# Patient Record
Sex: FEMALE | Race: BLACK OR AFRICAN AMERICAN | NOT HISPANIC OR LATINO | URBAN - METROPOLITAN AREA
[De-identification: names, ages, dates, MRNs, and addresses within clinical notes are randomized per-mention and may not be internally consistent; named-entity substitution may affect disease eponyms.]

---

## 2018-09-15 ENCOUNTER — EMERGENCY (EMERGENCY)
Facility: HOSPITAL | Age: 28
LOS: 1 days | Discharge: HOME | End: 2018-09-15
Attending: EMERGENCY MEDICINE
Payer: MEDICAID

## 2018-09-15 VITALS
RESPIRATION RATE: 18 BRPM | HEIGHT: 69 IN | SYSTOLIC BLOOD PRESSURE: 144 MMHG | TEMPERATURE: 99 F | DIASTOLIC BLOOD PRESSURE: 92 MMHG | HEART RATE: 71 BPM | WEIGHT: 190.04 LBS | OXYGEN SATURATION: 99 %

## 2018-09-15 LAB — S PYO AG SPEC QL IA: NEGATIVE — SIGNIFICANT CHANGE UP

## 2018-09-15 PROCEDURE — 99284 EMERGENCY DEPT VISIT MOD MDM: CPT

## 2018-09-15 RX ORDER — SODIUM CHLORIDE 9 MG/ML
1000 INJECTION INTRAMUSCULAR; INTRAVENOUS; SUBCUTANEOUS ONCE
Qty: 0 | Refills: 0 | Status: COMPLETED | OUTPATIENT
Start: 2018-09-15 | End: 2018-09-15

## 2018-09-15 RX ORDER — KETOROLAC TROMETHAMINE 30 MG/ML
30 SYRINGE (ML) INJECTION ONCE
Qty: 0 | Refills: 0 | Status: DISCONTINUED | OUTPATIENT
Start: 2018-09-15 | End: 2018-09-15

## 2018-09-15 RX ORDER — DEXAMETHASONE 0.5 MG/5ML
6 ELIXIR ORAL ONCE
Qty: 0 | Refills: 0 | Status: COMPLETED | OUTPATIENT
Start: 2018-09-15 | End: 2018-09-15

## 2018-09-15 NOTE — ED PROVIDER NOTE - PROGRESS NOTE DETAILS
pt reports improvement in sxs, is swallowing liquids and solids in the ed, pt to follow up with ent, d/c

## 2018-09-15 NOTE — ED ADULT TRIAGE NOTE - CHIEF COMPLAINT QUOTE
mother states that daughter has sore throat has been to ENT given medication without relief can not swallow or talk due to pain, has lost weight and feels nauseated, symptoms getting worse, tonsil swollen

## 2018-09-15 NOTE — ED PROVIDER NOTE - PHYSICAL EXAMINATION
Const: Awake, alert and oriented. In no acute distress. Well appearing.  HEENT: NC/AT. Moist mucous membranes. Ears: TM's clear bilaterally Throat: Pharynx erythematous, pus noted to back, uvula is midline, tongue symmetrical   Eyes: PERRL, conjunctiva pink, sclera white bilaterally. EOMI.  Neck:. Soft and supple. Full ROM without pain.  Cardiac:+S1/S2. No murmurs.   Resp: Speaking in full sentences. No evidence of respiratory distress. No wheezes, rales or rhonchi.  Abd: Soft, non-tender, non-distended. Normal bowel sounds in all 4 quadrants. No guarding or rebound.  Back: Spine midline and non-tender. No CVAT.  Skin: No rashes, abrasions or lacerations.  Lymph: No cervical lymphadenopathy.  Neuro: Awake, alert & oriented x 3. Moves all extremities symmetrically.

## 2018-09-15 NOTE — ED PROVIDER NOTE - OBJECTIVE STATEMENT
Patient is a 29 y/o female presenting with difficulty swallowing for the past week. Mother states last wednesday patient was experiencing a sore throat, went to urgent care and tested positive for mono. Patient states she was told to follow up with ENT, went to ENT this wednesday, who informed her to continue with antibiotics (clindamycin) and steroids. Patient states she has been having trouble swallowing for the past week, has lost ten pounds in one week. Patient admits to chills. Patient denies cp, SOB, nausea, vomiting.

## 2018-09-15 NOTE — ED STATDOCS - ATTENDING CONTRIBUTION TO CARE
I personally saw the patient with the PA, and completed the key components of the history and physical exam. I then discussed the management plan with the PA. In brief Hx (sore throat difficulty swallowing)  Exam (bilateral exuadates airway clear) Plan (steroids toradol fluids)

## 2018-09-16 LAB
ALBUMIN SERPL ELPH-MCNC: 4.4 G/DL — SIGNIFICANT CHANGE UP (ref 3.3–5.2)
ALP SERPL-CCNC: 81 U/L — SIGNIFICANT CHANGE UP (ref 40–120)
ALT FLD-CCNC: 48 U/L — HIGH
ANION GAP SERPL CALC-SCNC: 17 MMOL/L — SIGNIFICANT CHANGE UP (ref 5–17)
AST SERPL-CCNC: 24 U/L — SIGNIFICANT CHANGE UP
BASOPHILS # BLD AUTO: 0 K/UL — SIGNIFICANT CHANGE UP (ref 0–0.2)
BASOPHILS NFR BLD AUTO: 0.3 % — SIGNIFICANT CHANGE UP (ref 0–2)
BILIRUB SERPL-MCNC: 1.1 MG/DL — SIGNIFICANT CHANGE UP (ref 0.4–2)
BUN SERPL-MCNC: 9 MG/DL — SIGNIFICANT CHANGE UP (ref 8–20)
CALCIUM SERPL-MCNC: 9.6 MG/DL — SIGNIFICANT CHANGE UP (ref 8.6–10.2)
CHLORIDE SERPL-SCNC: 100 MMOL/L — SIGNIFICANT CHANGE UP (ref 98–107)
CO2 SERPL-SCNC: 25 MMOL/L — SIGNIFICANT CHANGE UP (ref 22–29)
CREAT SERPL-MCNC: 0.8 MG/DL — SIGNIFICANT CHANGE UP (ref 0.5–1.3)
EOSINOPHIL # BLD AUTO: 0 K/UL — SIGNIFICANT CHANGE UP (ref 0–0.5)
EOSINOPHIL NFR BLD AUTO: 0.1 % — SIGNIFICANT CHANGE UP (ref 0–6)
GLUCOSE SERPL-MCNC: 109 MG/DL — SIGNIFICANT CHANGE UP (ref 70–115)
HCT VFR BLD CALC: 41.6 % — SIGNIFICANT CHANGE UP (ref 37–47)
HGB BLD-MCNC: 13.6 G/DL — SIGNIFICANT CHANGE UP (ref 12–16)
LYMPHOCYTES # BLD AUTO: 29.1 % — SIGNIFICANT CHANGE UP (ref 20–55)
LYMPHOCYTES # BLD AUTO: 3.7 K/UL — SIGNIFICANT CHANGE UP (ref 1–4.8)
MCHC RBC-ENTMCNC: 31.5 PG — HIGH (ref 27–31)
MCHC RBC-ENTMCNC: 32.7 G/DL — SIGNIFICANT CHANGE UP (ref 32–36)
MCV RBC AUTO: 96.3 FL — SIGNIFICANT CHANGE UP (ref 81–99)
MONOCYTES # BLD AUTO: 0.4 K/UL — SIGNIFICANT CHANGE UP (ref 0–0.8)
MONOCYTES NFR BLD AUTO: 3.1 % — SIGNIFICANT CHANGE UP (ref 3–10)
NEUTROPHILS # BLD AUTO: 8.6 K/UL — HIGH (ref 1.8–8)
NEUTROPHILS NFR BLD AUTO: 67.1 % — SIGNIFICANT CHANGE UP (ref 37–73)
PLATELET # BLD AUTO: 259 K/UL — SIGNIFICANT CHANGE UP (ref 150–400)
POTASSIUM SERPL-MCNC: 3.4 MMOL/L — LOW (ref 3.5–5.3)
POTASSIUM SERPL-SCNC: 3.4 MMOL/L — LOW (ref 3.5–5.3)
PROT SERPL-MCNC: 8.4 G/DL — SIGNIFICANT CHANGE UP (ref 6.6–8.7)
RBC # BLD: 4.32 M/UL — LOW (ref 4.4–5.2)
RBC # FLD: 12.7 % — SIGNIFICANT CHANGE UP (ref 11–15.6)
SODIUM SERPL-SCNC: 142 MMOL/L — SIGNIFICANT CHANGE UP (ref 135–145)
WBC # BLD: 12.8 K/UL — HIGH (ref 4.8–10.8)
WBC # FLD AUTO: 12.8 K/UL — HIGH (ref 4.8–10.8)

## 2018-09-16 PROCEDURE — 70360 X-RAY EXAM OF NECK: CPT | Mod: 26

## 2018-09-16 PROCEDURE — 80053 COMPREHEN METABOLIC PANEL: CPT

## 2018-09-16 PROCEDURE — 87081 CULTURE SCREEN ONLY: CPT

## 2018-09-16 PROCEDURE — 85027 COMPLETE CBC AUTOMATED: CPT

## 2018-09-16 PROCEDURE — 96375 TX/PRO/DX INJ NEW DRUG ADDON: CPT

## 2018-09-16 PROCEDURE — 36415 COLL VENOUS BLD VENIPUNCTURE: CPT

## 2018-09-16 PROCEDURE — 87880 STREP A ASSAY W/OPTIC: CPT

## 2018-09-16 PROCEDURE — 70360 X-RAY EXAM OF NECK: CPT

## 2018-09-16 PROCEDURE — 96374 THER/PROPH/DIAG INJ IV PUSH: CPT

## 2018-09-16 PROCEDURE — 99284 EMERGENCY DEPT VISIT MOD MDM: CPT | Mod: 25

## 2018-09-16 RX ADMIN — SODIUM CHLORIDE 1000 MILLILITER(S): 9 INJECTION INTRAMUSCULAR; INTRAVENOUS; SUBCUTANEOUS at 00:22

## 2018-09-16 RX ADMIN — Medication 30 MILLIGRAM(S): at 00:24

## 2018-09-16 RX ADMIN — Medication 6 MILLIGRAM(S): at 00:24

## 2018-09-16 NOTE — ED ADULT NURSE NOTE - OBJECTIVE STATEMENT
pt reports she recently had mono which lead to a tonsil infection. pt reports she has been on abx for this infection and has seen ent. pt reports increased pain with talking and swallowing. pt reports "my voice sounds muffled". a and o x3. breathing even and unlabored. lungs cta. pt handling secretions without issue. denies fevers, chills. sitting calm in chair. will continue to monitor.

## 2018-09-16 NOTE — ED ADULT NURSE NOTE - NSIMPLEMENTINTERV_GEN_ALL_ED
Implemented All Universal Safety Interventions:  [place] to call system. Call bell, personal items and telephone within reach. Instruct patient to call for assistance. Room bathroom lighting operational. Non-slip footwear when patient is off stretcher. Physically safe environment: no spills, clutter or unnecessary equipment. Stretcher in lowest position, wheels locked, appropriate side rails in place.

## 2018-09-16 NOTE — ED ADULT NURSE NOTE - CHPI ED NUR SYMPTOMS NEG
no loss of consciousness/no numbness/no vomiting/no weakness/no bleeding gums/no chills/no nausea/no syncope/no fever

## 2018-09-17 ENCOUNTER — EMERGENCY (EMERGENCY)
Facility: HOSPITAL | Age: 28
LOS: 1 days | Discharge: HOME | End: 2018-09-17
Attending: EMERGENCY MEDICINE
Payer: MEDICAID

## 2018-09-17 VITALS
OXYGEN SATURATION: 100 % | SYSTOLIC BLOOD PRESSURE: 105 MMHG | HEART RATE: 68 BPM | TEMPERATURE: 99 F | DIASTOLIC BLOOD PRESSURE: 68 MMHG

## 2018-09-17 VITALS — HEIGHT: 69 IN | WEIGHT: 190.04 LBS

## 2018-09-17 LAB
ANION GAP SERPL CALC-SCNC: 13 MMOL/L — SIGNIFICANT CHANGE UP (ref 5–17)
BUN SERPL-MCNC: 11 MG/DL — SIGNIFICANT CHANGE UP (ref 8–20)
CALCIUM SERPL-MCNC: 9.4 MG/DL — SIGNIFICANT CHANGE UP (ref 8.6–10.2)
CHLORIDE SERPL-SCNC: 102 MMOL/L — SIGNIFICANT CHANGE UP (ref 98–107)
CO2 SERPL-SCNC: 27 MMOL/L — SIGNIFICANT CHANGE UP (ref 22–29)
CREAT SERPL-MCNC: 0.85 MG/DL — SIGNIFICANT CHANGE UP (ref 0.5–1.3)
CULTURE RESULTS: SIGNIFICANT CHANGE UP
GLUCOSE SERPL-MCNC: 92 MG/DL — SIGNIFICANT CHANGE UP (ref 70–115)
HCT VFR BLD CALC: 40.6 % — SIGNIFICANT CHANGE UP (ref 37–47)
HGB BLD-MCNC: 13.1 G/DL — SIGNIFICANT CHANGE UP (ref 12–16)
LYMPHOCYTES # BLD AUTO: 23 % — SIGNIFICANT CHANGE UP (ref 20–55)
MCHC RBC-ENTMCNC: 31.1 PG — HIGH (ref 27–31)
MCHC RBC-ENTMCNC: 32.3 G/DL — SIGNIFICANT CHANGE UP (ref 32–36)
MCV RBC AUTO: 96.4 FL — SIGNIFICANT CHANGE UP (ref 81–99)
MONOCYTES NFR BLD AUTO: 8 % — SIGNIFICANT CHANGE UP (ref 3–10)
NEUTROPHILS NFR BLD AUTO: 69 % — SIGNIFICANT CHANGE UP (ref 37–73)
PLAT MORPH BLD: NORMAL — SIGNIFICANT CHANGE UP
PLATELET # BLD AUTO: 246 K/UL — SIGNIFICANT CHANGE UP (ref 150–400)
POTASSIUM SERPL-MCNC: 3.1 MMOL/L — LOW (ref 3.5–5.3)
POTASSIUM SERPL-SCNC: 3.1 MMOL/L — LOW (ref 3.5–5.3)
RBC # BLD: 4.21 M/UL — LOW (ref 4.4–5.2)
RBC # FLD: 12.8 % — SIGNIFICANT CHANGE UP (ref 11–15.6)
RBC BLD AUTO: NORMAL — SIGNIFICANT CHANGE UP
SODIUM SERPL-SCNC: 142 MMOL/L — SIGNIFICANT CHANGE UP (ref 135–145)
SPECIMEN SOURCE: SIGNIFICANT CHANGE UP
WBC # BLD: 13.2 K/UL — HIGH (ref 4.8–10.8)
WBC # FLD AUTO: 13.2 K/UL — HIGH (ref 4.8–10.8)

## 2018-09-17 PROCEDURE — 96374 THER/PROPH/DIAG INJ IV PUSH: CPT | Mod: XU

## 2018-09-17 PROCEDURE — 96376 TX/PRO/DX INJ SAME DRUG ADON: CPT | Mod: XU

## 2018-09-17 PROCEDURE — 36415 COLL VENOUS BLD VENIPUNCTURE: CPT

## 2018-09-17 PROCEDURE — 80048 BASIC METABOLIC PNL TOTAL CA: CPT

## 2018-09-17 PROCEDURE — G0378: CPT

## 2018-09-17 PROCEDURE — 96375 TX/PRO/DX INJ NEW DRUG ADDON: CPT

## 2018-09-17 PROCEDURE — 70491 CT SOFT TISSUE NECK W/DYE: CPT

## 2018-09-17 PROCEDURE — 70491 CT SOFT TISSUE NECK W/DYE: CPT | Mod: 26

## 2018-09-17 PROCEDURE — 85027 COMPLETE CBC AUTOMATED: CPT

## 2018-09-17 PROCEDURE — 99236 HOSP IP/OBS SAME DATE HI 85: CPT

## 2018-09-17 PROCEDURE — 99284 EMERGENCY DEPT VISIT MOD MDM: CPT | Mod: 25

## 2018-09-17 RX ORDER — CEPHALEXIN 500 MG
500 CAPSULE ORAL EVERY 12 HOURS
Qty: 0 | Refills: 0 | Status: DISCONTINUED | OUTPATIENT
Start: 2018-09-17 | End: 2018-09-17

## 2018-09-17 RX ORDER — ACETAMINOPHEN 500 MG
650 TABLET ORAL EVERY 6 HOURS
Qty: 0 | Refills: 0 | Status: DISCONTINUED | OUTPATIENT
Start: 2018-09-17 | End: 2018-09-22

## 2018-09-17 RX ORDER — ONDANSETRON 8 MG/1
4 TABLET, FILM COATED ORAL ONCE
Qty: 0 | Refills: 0 | Status: COMPLETED | OUTPATIENT
Start: 2018-09-17 | End: 2018-09-17

## 2018-09-17 RX ORDER — DEXAMETHASONE 0.5 MG/5ML
4 ELIXIR ORAL EVERY 6 HOURS
Qty: 0 | Refills: 0 | Status: DISCONTINUED | OUTPATIENT
Start: 2018-09-17 | End: 2018-09-22

## 2018-09-17 RX ORDER — SODIUM CHLORIDE 9 MG/ML
1000 INJECTION INTRAMUSCULAR; INTRAVENOUS; SUBCUTANEOUS ONCE
Qty: 0 | Refills: 0 | Status: COMPLETED | OUTPATIENT
Start: 2018-09-17 | End: 2018-09-17

## 2018-09-17 RX ORDER — LIDOCAINE 4 G/100G
10 CREAM TOPICAL THREE TIMES A DAY
Qty: 0 | Refills: 0 | Status: DISCONTINUED | OUTPATIENT
Start: 2018-09-17 | End: 2018-09-22

## 2018-09-17 RX ORDER — KETOROLAC TROMETHAMINE 30 MG/ML
30 SYRINGE (ML) INJECTION ONCE
Qty: 0 | Refills: 0 | Status: DISCONTINUED | OUTPATIENT
Start: 2018-09-17 | End: 2018-09-17

## 2018-09-17 RX ORDER — SODIUM CHLORIDE 9 MG/ML
1000 INJECTION, SOLUTION INTRAVENOUS
Qty: 0 | Refills: 0 | Status: DISCONTINUED | OUTPATIENT
Start: 2018-09-17 | End: 2018-09-22

## 2018-09-17 RX ADMIN — Medication 4 MILLIGRAM(S): at 14:48

## 2018-09-17 RX ADMIN — LIDOCAINE 10 MILLILITER(S): 4 CREAM TOPICAL at 09:14

## 2018-09-17 RX ADMIN — Medication 500 MILLIGRAM(S): at 04:36

## 2018-09-17 RX ADMIN — Medication 30 MILLIGRAM(S): at 05:07

## 2018-09-17 RX ADMIN — Medication 4 MILLIGRAM(S): at 09:08

## 2018-09-17 RX ADMIN — Medication 650 MILLIGRAM(S): at 16:22

## 2018-09-17 RX ADMIN — Medication 30 MILLIGRAM(S): at 16:54

## 2018-09-17 RX ADMIN — Medication 20 MILLIGRAM(S): at 04:36

## 2018-09-17 RX ADMIN — Medication 30 MILLIGRAM(S): at 05:22

## 2018-09-17 RX ADMIN — ONDANSETRON 4 MILLIGRAM(S): 8 TABLET, FILM COATED ORAL at 05:10

## 2018-09-17 RX ADMIN — Medication 100 MILLIGRAM(S): at 09:15

## 2018-09-17 RX ADMIN — SODIUM CHLORIDE 2000 MILLILITER(S): 9 INJECTION INTRAMUSCULAR; INTRAVENOUS; SUBCUTANEOUS at 05:07

## 2018-09-17 RX ADMIN — Medication 100 MILLIGRAM(S): at 16:54

## 2018-09-17 RX ADMIN — SODIUM CHLORIDE 1000 MILLILITER(S): 9 INJECTION INTRAMUSCULAR; INTRAVENOUS; SUBCUTANEOUS at 05:23

## 2018-09-17 RX ADMIN — SODIUM CHLORIDE 125 MILLILITER(S): 9 INJECTION, SOLUTION INTRAVENOUS at 09:15

## 2018-09-17 NOTE — ED ADULT NURSE REASSESSMENT NOTE - GENERAL PATIENT STATE
assumed care of patient, pt sitting up in stretcher, occasional spitting saliva into emesis bag, made aware of NPO status, awaiting ENT later this evening to drain abscess. IVF continue to infuse to 20g LAC.

## 2018-09-17 NOTE — ED ADULT NURSE NOTE - OBJECTIVE STATEMENT
I have a lot of pain in my throat told tonsilitis I was here yesterday and on fri I saw an ent dr watkins

## 2018-09-17 NOTE — ED CDU PROVIDER INITIAL DAY NOTE - PROGRESS NOTE DETAILS
ct showing pta I and d attempted no purlrnet drainage will consult ent about another I and vs office f/u today am attending awar eof plan of care

## 2018-09-17 NOTE — ED ADULT NURSE REASSESSMENT NOTE - GENERAL PATIENT STATE
improvement verbalized/pt reports improvement in speaking/ visitor at bedside/comfortable appearance

## 2018-09-17 NOTE — ED ADULT NURSE REASSESSMENT NOTE - REASSESS COMMUNICATION
pt medicated for pain increase. provided with oral care swabs. offered lido as ordered PRN but pt refused.

## 2018-09-17 NOTE — ED PROVIDER NOTE - CARE PLAN
Principal Discharge DX:	Pharyngitis Principal Discharge DX:	Pharyngitis  Secondary Diagnosis:	Peritonsillar abscess

## 2018-09-17 NOTE — ED PROVIDER NOTE - OBJECTIVE STATEMENT
pt presents with continued throat pain no drooling no stridor dx mono last week finsihed po clinda still with thraot pain. pain constant achy non radiating. denies fever. denies HA or neck pain. no chest pain or sob. no abd pain. no n/v/d. no urinary f/u/d. no back pain. no motor or sensory deficits. denies illicit drug use. no recent travel. no rash. no other acute issues symptoms or concerns

## 2018-09-17 NOTE — ED PROVIDER NOTE - PROGRESS NOTE DETAILS
pt with nausea vomiting had hard time holding down po meds 2/2 to pain will palce iv antiemetics scan for small pta vs rpa and am attending aware of need for reassessemtn ct showing pta I and d attempted no purlrnet drainage will consult ent about another I and vs office f/u today am attending awar eof plan of care Garcia: received sign out; d/w Dr Richard ENT; will come to see this evening for bedside I&D. will keep on obs today for clinda, pain control, and steroids

## 2018-09-17 NOTE — ED ADULT TRIAGE NOTE - CHIEF COMPLAINT QUOTE
caleb ambulated to er complaining of throat pain difficulty swallowing - patient currently has tonsilitis and was told to come back due to pain

## 2018-09-17 NOTE — ED CDU PROVIDER INITIAL DAY NOTE - MEDICAL DECISION MAKING DETAILS
ENT consult pending; pain control; NPO; d/w Dr Richard, will see later this evening; will keep antibx and steroids for now

## 2018-09-17 NOTE — ED PROVIDER NOTE - ENMT, MLM
Airway patent, Nasal mucosa clear. Mouth with normal mucosa. + pharyngeal erythema with b/l tonisllar exudates uvual midline no overt pta

## 2018-09-17 NOTE — ED CDU PROVIDER DISPOSITION NOTE - CLINICAL COURSE
seen by ENT; much improved over course of day with antibiotics and steroids, pain better, speech better; no uvula deviation; ENT does not feel clinical need to drain abscess at this juncture; d/c with antibx steroids and outpt f/u

## 2018-09-17 NOTE — ED ADULT NURSE REASSESSMENT NOTE - NS ED NURSE REASSESS COMMENT FT1
pt given po meds pt vomiting dr hamm made aware.   pills not noted in emesis pt requesting pain meds

## 2018-10-18 ENCOUNTER — OUTPATIENT (OUTPATIENT)
Dept: OUTPATIENT SERVICES | Facility: HOSPITAL | Age: 28
LOS: 1 days | End: 2018-10-18

## 2018-10-18 VITALS
OXYGEN SATURATION: 97 % | WEIGHT: 195.99 LBS | HEART RATE: 83 BPM | RESPIRATION RATE: 15 BRPM | HEIGHT: 69 IN | SYSTOLIC BLOOD PRESSURE: 120 MMHG | TEMPERATURE: 99 F | DIASTOLIC BLOOD PRESSURE: 72 MMHG

## 2018-10-18 DIAGNOSIS — J35.01 CHRONIC TONSILLITIS: ICD-10-CM

## 2018-10-18 RX ORDER — IPRATROPIUM BROMIDE 21 MCG
2 AEROSOL, SPRAY (ML) NASAL
Qty: 0 | Refills: 0 | COMMUNITY

## 2018-10-18 RX ORDER — ACETAMINOPHEN WITH CODEINE 300MG-30MG
5 TABLET ORAL
Qty: 0 | Refills: 0 | COMMUNITY

## 2018-10-18 NOTE — H&P PST ADULT - HISTORY OF PRESENT ILLNESS
29yo female denies significant medical and c/o recurrent tonsilitis in 2017 and last episode 9/2018 with tonsillar abscess. Pt presents today for presurgical testing for Tonsillectomy scheduled for 10/24/2018.

## 2018-10-18 NOTE — H&P PST ADULT - MUSCULOSKELETAL
negative detailed exam no joint erythema/no joint swelling/normal strength/ROM intact/no joint warmth/no calf tenderness

## 2018-10-18 NOTE — H&P PST ADULT - PROBLEM SELECTOR PLAN 1
Tonsillectomy scheduled for 10/24/2018. Tonsillectomy scheduled for 10/24/2018.  Pre-op instructions given. Pt verbalized understanding  Pepcid given for GI prophylaxis  UCG ordered STAT for day of procedure  Labs and Medical clearance in chart

## 2018-10-18 NOTE — H&P PST ADULT - ENMT COMMENTS
9/2018 tonsillar abscess was treated - no present symptoms tonsils 2+ no s/s of infection/inflammation

## 2018-10-18 NOTE — H&P PST ADULT - NEGATIVE ENMT SYMPTOMS
no hearing difficulty/no ear pain/no sinus symptoms/no nasal congestion/no throat pain/no dysphagia/no vertigo/no tinnitus/no nasal discharge

## 2018-10-18 NOTE — H&P PST ADULT - ATTENDING COMMENTS
Plan is for tonsillectomy. RBAI discussed and include but not limited to bleeding, infection, pain, scarring, change in voice quality, swallowing issues, persistence/recurrence of the problem, damage to lips, teeth, mouth, throat, and need for further surgery. Proceed to OR.

## 2018-10-23 ENCOUNTER — TRANSCRIPTION ENCOUNTER (OUTPATIENT)
Age: 28
End: 2018-10-23

## 2018-10-24 ENCOUNTER — OUTPATIENT (OUTPATIENT)
Dept: OUTPATIENT SERVICES | Facility: HOSPITAL | Age: 28
LOS: 1 days | Discharge: HOME | End: 2018-10-24
Payer: MEDICAID

## 2018-10-24 VITALS — SYSTOLIC BLOOD PRESSURE: 119 MMHG | DIASTOLIC BLOOD PRESSURE: 69 MMHG | OXYGEN SATURATION: 99 % | HEART RATE: 66 BPM

## 2018-10-24 VITALS
HEART RATE: 76 BPM | WEIGHT: 195.99 LBS | TEMPERATURE: 98 F | DIASTOLIC BLOOD PRESSURE: 60 MMHG | SYSTOLIC BLOOD PRESSURE: 127 MMHG | HEIGHT: 69 IN | RESPIRATION RATE: 16 BRPM | OXYGEN SATURATION: 100 %

## 2018-10-24 DIAGNOSIS — J35.01 CHRONIC TONSILLITIS: ICD-10-CM

## 2018-10-24 PROCEDURE — 88304 TISSUE EXAM BY PATHOLOGIST: CPT | Mod: 26

## 2018-10-24 RX ORDER — FEXOFENADINE HCL 30 MG
1 TABLET ORAL
Qty: 0 | Refills: 0 | COMMUNITY

## 2018-10-24 RX ORDER — ETONOGESTREL 68 MG/1
1 IMPLANT SUBCUTANEOUS
Qty: 0 | Refills: 0 | COMMUNITY

## 2018-10-24 RX ORDER — NORETHINDRONE 0.35 MG/1
1 TABLET ORAL
Qty: 0 | Refills: 0 | COMMUNITY

## 2018-10-24 NOTE — ASU PREOP CHECKLIST - TYPE OF SOLUTION
"For eczema:  Continue doing what you are doing in terms of skin care. STOP the triamcinolone cream. Switch to betamethasone cream applied twice daily to spots of eczema. DO NOT USE THIS MEDICINE FOR MORE THAN 2 WEEKS.    You can try cutting out one or two things from your diet to see if it helps her skin.    Follow up with Dr. Mahmood in 1-2 weeks.      Your 9 Month Old  Next Visit:  - Next visit: When your child is 12 months old  - Expect:  More immunizations!                                                                 Here are some tips to help keep your baby healthy, safe and happy!  Feeding:  - Let your baby have finger foods like well-cooked noodles, small pieces of chicken, cereals, and chunks of banana.  - Help your baby to drink from a cup.  To get started try a  cup or a small plastic juice glass.  Safety:  - Your baby thinks the world is his playground.  Help keep him safe by:  ? using safety latches on cabinets and drawers  ? using [place] across stairs  ? opening windows from the top if possible.  If you must open them from the bottom, install window bars.   ? never putting chairs, sofas, low tables or anything else a child might climb on in front of a window.   ? keeping anything your baby shouldn't swallow out of reach in high cupboards.   - Put safety plugs in all unused electrical outlets so your baby can't stick his finger or a toy into the holes.  Also use outlet covers that can fit over plugged-in cords.   - Post the Poison Control number (1-286.276.7702) near every phone in your home.    - Use an approved and properly installed infant car seat for every ride.  The seat should face backwards until your baby is 2 years old.  Never put the car seat in the front seat.  Then he can face forward in a convertible infant seat or in a toddler seat.  Never put a rear facing infant seat in the front seat .  HOME LIFE:   - Discipline means \"to teach\".  Praise your baby when he does something you " like with a smile, a hug and soft words.  Distract him with a toy or other activity when he does something you don't like.  Never hit your baby.  He's not old enough to misbehave on purpose.  He won't understand if you punish or yell.  Set a few simple limits and be consistent.   - A bedtime routine will help your baby settle down to sleep.  Try a warm bath, a massage, rocking, a story or lullaby, or soft music.  Settle him into his crib while he's still awake so he learns to fall asleep on his own.  - When your baby begins to walk he'll need shoes to protect his feet.  Look for comfortable shoes with nonskid soles.  Sneakers are fine.  - Your baby will probably become anxious, clinging, and easily frightened around strangers.  This is normal for this age and you need not worry.  Development:  - At nine months your child can:  ? pull himself to a standing position  ? sit without support  ? play peek-a-tavarez  ? chatter  - Give your child:      ? books to look at  ? stacking toys  ? paper tubes, empty boxes, egg cartons  ? praise, hugs, affection     IVLR @ 30ml/hr

## 2018-10-24 NOTE — ASU DISCHARGE PLAN (ADULT/PEDIATRIC). - INSTRUCTIONS
SEE SHEET No hard or crunchy food. Avoid dry foods. Eat foods like pudding, jello, apple sauce, yogurt, etc.

## 2022-07-20 NOTE — ED ADULT NURSE NOTE - CAS EDN DISCHARGE ASSESSMENT
Yes, she can go ahead and get the booster.  We usually have people wait 3 months after they have been sick so she would be beyond that   Alert and oriented to person, place and time

## 2024-03-25 PROBLEM — B27.90 INFECTIOUS MONONUCLEOSIS, UNSPECIFIED WITHOUT COMPLICATION: Chronic | Status: ACTIVE | Noted: 2018-10-18

## 2024-03-25 PROBLEM — Z00.00 ENCOUNTER FOR PREVENTIVE HEALTH EXAMINATION: Status: ACTIVE | Noted: 2024-03-25

## 2024-03-25 PROBLEM — J36 PERITONSILLAR ABSCESS: Chronic | Status: ACTIVE | Noted: 2018-10-18

## 2024-03-28 ENCOUNTER — APPOINTMENT (OUTPATIENT)
Dept: ORTHOPEDIC SURGERY | Facility: CLINIC | Age: 34
End: 2024-03-28
Payer: COMMERCIAL

## 2024-03-28 VITALS
BODY MASS INDEX: 30.81 KG/M2 | DIASTOLIC BLOOD PRESSURE: 87 MMHG | WEIGHT: 208 LBS | HEART RATE: 73 BPM | SYSTOLIC BLOOD PRESSURE: 134 MMHG | HEIGHT: 69 IN

## 2024-03-28 DIAGNOSIS — M47.816 SPONDYLOSIS W/OUT MYELOPATHY OR RADICULOPATHY, LUMBAR REGION: ICD-10-CM

## 2024-03-28 DIAGNOSIS — G56.80 OTHER SPECIFIED MONONEUROPATHIES OF UNSPECIFIED UPPER LIMB: ICD-10-CM

## 2024-03-28 PROCEDURE — 72100 X-RAY EXAM L-S SPINE 2/3 VWS: CPT

## 2024-03-28 PROCEDURE — 99203 OFFICE O/P NEW LOW 30 MIN: CPT

## 2024-03-28 RX ORDER — NORETHINDRONE ACETATE AND ETHINYL ESTRADIOL 1.5; 3 MG/1; UG/1
TABLET ORAL
Refills: 0 | Status: ACTIVE | COMMUNITY

## 2024-03-28 NOTE — HISTORY OF PRESENT ILLNESS
[de-identified] : 33-year-old female presents today for evaluation of lower back pain.  She is her pain initially began 3 years ago where she had excruciating lower back pain that rendered her immobile for period of time.  She states she was seen by her primary care provider who ordered no imaging and treated with muscle relaxers.  Eventually this pain resolved and she was able to get back to her activities.  However since then she has had pain described as dull in the lower back.  Most recently she states an exacerbation occurred approximately 1 week ago without any known inciting event or trauma.  Her pain has slowly proved since then but has failed to fully resolve.  She has not had any formal treatment with regards to chiropractic or physical therapy treatment.  She is active and does regular home exercises as well as gym exercises.  On occasion she will feel some radiating pain into the right anterior thigh.  No weakness, numbness or tingling bilateral lower extremities.  No bowel / bladder incontinence. No saddle anesthesia.  ADRIAN questionnaire is negative. There is no ataxia or other abnormal gait. Patient is not falling more than usual and does not have any decrease in dexterity.  Patient also describing right scapulothoracic pain.  This has also been chronic in nature without any inciting event and is worse with prolonged sitting and activities.  It is described as a pressure in the area relieved with range of motion of the shoulder.

## 2024-03-28 NOTE — DISCUSSION/SUMMARY
[Medication Risks Reviewed] : Medication risks reviewed [de-identified] : 33 year old female presents with symptoms suggestive of lumbar myositis, with history of possible discogenic pathology 3 years ago. 25 minutes was spent reviewing the x-rays as well as discussing with the patient their clinical presentation, diagnosis and providing education. Conservative treatment was discussed with the patient at length. Anticipatory guidance regarding disease process, avoidance of acute exacerbation this was discussed at length and all patients commenting concerns were answered to the patient's satisfaction. Physical therapy for decrease pain and increase function was ordered. The patient was given home exercises as approved by [employer] and directed toward this particular process. Intermittent use of acetaminophen 500 mg 2 tablets t.i.d. p.r.n. mild to moderate pain, ibuprofen 600 mg t.i.d. p.r.n. severe pain with food or milk if there is no medical contraindication was also discussed. Home exercise including stretching on a daily basis for 20-30 minutes was recommended. Heat, ice, topical were discussed as needed. The patient will followup in 6-8 weeks at which point in time if symptoms continue we will order MRI studies to guide treatment plan including possible injection therapy with pain management versus surgical option. All questions and concerns were addressed with the patient and they are in agreement with this plan.  This note was generated using dragon medical dictation software. A reasonable effort has been made for proofreading its contents, but typos may still remain. If there are any questions or points of clarification needed please notify my office.

## 2024-03-28 NOTE — PHYSICAL EXAM
[de-identified] : CONSTITUTIONAL: The patient is a very pleasant individual who is well-nourished and who appears stated age. PSYCHIATRIC: The patient is alert and oriented X 3 and in no apparent distress, and participates with orthopedic evaluation well. HEAD: Atraumatic and is nonsyndromic in appearance. EENT: No visible thyromegaly, EOMI. RESPIRATORY: Respiratory rate is regular, not dyspneic on examination. LYMPHATICS: There is no inguinal lymphadenopathy INTEGUMENTARY: Skin is clean, dry, and intact about the bilateral lower extremities and lumbar spine. VASCULAR: There is brisk capillary refill about the bilateral lower extremities. NEUROLOGIC: There are no pathologic reflexes. There is no decrease in sensation of the bilateral lower extremities on manual  examination. Deep tendon reflexes are well maintained at 2+/4 of the bilateral lower extremities and are symmetric.. MUSCULOSKELETAL: There is no visible muscular atrophy. Manual motor strength is well maintained in the bilateral lower extremities. Range of motion of lumbar spine is well maintained. The patient ambulates in a non-myelopathic manner. Negative tension sign and straight leg raise bilaterally. Quad extension, ankle dorsiflexion, EHL, plantar flexion, and ankle eversion are well preserved. Normal secondary orthopaedic exam of bilateral hips, greater trochanteric area, knees and ankles.  Mechanically oriented lower back pain.  Mechanically oriented scapulothoracic pain. [de-identified] : 3 views of the lumbar spine have been reviewed on today's date demonstrates well-maintained lumbar lordosis to spaces are maintained no acute osseous abnormality subtle rotation on the AP plane.  3 views March 28, 2024.

## 2024-07-10 ENCOUNTER — TRANSCRIPTION ENCOUNTER (OUTPATIENT)
Age: 34
End: 2024-07-10

## 2024-07-10 ENCOUNTER — APPOINTMENT (OUTPATIENT)
Dept: ORTHOPEDIC SURGERY | Facility: CLINIC | Age: 34
End: 2024-07-10

## 2024-07-10 VITALS — HEIGHT: 69 IN | WEIGHT: 208 LBS | BODY MASS INDEX: 30.81 KG/M2

## 2024-07-10 DIAGNOSIS — Z78.9 OTHER SPECIFIED HEALTH STATUS: ICD-10-CM

## 2024-07-10 PROCEDURE — 99203 OFFICE O/P NEW LOW 30 MIN: CPT | Mod: 25

## 2024-07-10 PROCEDURE — 72110 X-RAY EXAM L-2 SPINE 4/>VWS: CPT

## 2024-07-10 RX ORDER — PREDNISONE 10 MG/1
10 TABLET ORAL
Qty: 22 | Refills: 0 | Status: ACTIVE | COMMUNITY
Start: 2024-07-10 | End: 1900-01-01

## 2024-07-10 RX ORDER — CYCLOBENZAPRINE HYDROCHLORIDE 5 MG/1
5 TABLET, FILM COATED ORAL 3 TIMES DAILY
Qty: 30 | Refills: 1 | Status: ACTIVE | COMMUNITY
Start: 2024-07-10 | End: 1900-01-01

## 2024-07-12 ENCOUNTER — APPOINTMENT (OUTPATIENT)
Dept: ORTHOPEDIC SURGERY | Facility: CLINIC | Age: 34
End: 2024-07-12

## 2024-07-22 ENCOUNTER — APPOINTMENT (OUTPATIENT)
Dept: ORTHOPEDIC SURGERY | Facility: CLINIC | Age: 34
End: 2024-07-22
Payer: COMMERCIAL

## 2024-07-22 VITALS — BODY MASS INDEX: 30.81 KG/M2 | WEIGHT: 208 LBS | HEIGHT: 69 IN

## 2024-07-22 DIAGNOSIS — M51.27 OTHER INTERVERTEBRAL DISC DISPLACEMENT, LUMBOSACRAL REGION: ICD-10-CM

## 2024-07-22 DIAGNOSIS — M47.816 SPONDYLOSIS W/OUT MYELOPATHY OR RADICULOPATHY, LUMBAR REGION: ICD-10-CM

## 2024-07-22 PROCEDURE — 99203 OFFICE O/P NEW LOW 30 MIN: CPT

## 2024-07-22 RX ORDER — MELOXICAM 15 MG/1
15 TABLET ORAL DAILY
Qty: 30 | Refills: 2 | Status: ACTIVE | COMMUNITY
Start: 2024-07-22 | End: 1900-01-01

## 2024-07-22 NOTE — DATA REVIEWED
[MRI] : MRI [Lumbar Spine] : lumbar spine [I independently reviewed and interpreted images and report] : I independently reviewed and interpreted images and report [FreeTextEntry1] : l5-s1 HNP

## 2024-07-22 NOTE — HISTORY OF PRESENT ILLNESS
[Lower back] : lower back [0] : 0 [de-identified] : 07/22/2024: Took prednisone and muscle relaxers with good relief. MRI review today.   07/10/2024: 34 yr old female presents c/o episodic low back pain and spasm with intermittent left anterior thigh pain that developed in 2020 with no acute injury or trauma. Patient reports most recent flare started 7/5 started on course of naproxen with no improvement. She has taken MDP and muscle relaxers after previous flar ups, she has also gone to chiro and massage.  symptoms worsen when standing or sitting for extended time, radiculopathy begins when sitting or driving. denies n/t, denies change in b/b function.   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

## 2024-07-22 NOTE — ASSESSMENT
[FreeTextEntry1] : 34 F with LBp and  LLe Radic. failed chiro, MDP, nsaids  Prednisone taper imrpoed pain  Begin PT FU 6 weeks restart NSAIDS  We will also provide a prescription for anti-inflammatories.  Discussed major side effects of medication including but not limited to gastritis and acute kidney injury.  She was instructed to take with food and to discontinue use if stomach or esophageal pain developed.

## 2024-07-22 NOTE — HISTORY OF PRESENT ILLNESS
[Lower back] : lower back [0] : 0 [de-identified] : 07/22/2024: Took prednisone and muscle relaxers with good relief. MRI review today.   07/10/2024: 34 yr old female presents c/o episodic low back pain and spasm with intermittent left anterior thigh pain that developed in 2020 with no acute injury or trauma. Patient reports most recent flare started 7/5 started on course of naproxen with no improvement. She has taken MDP and muscle relaxers after previous flar ups, she has also gone to chiro and massage.  symptoms worsen when standing or sitting for extended time, radiculopathy begins when sitting or driving. denies n/t, denies change in b/b function.   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

## 2024-09-09 ENCOUNTER — APPOINTMENT (OUTPATIENT)
Dept: ORTHOPEDIC SURGERY | Facility: CLINIC | Age: 34
End: 2024-09-09
Payer: COMMERCIAL

## 2024-09-09 VITALS — WEIGHT: 208 LBS | HEIGHT: 69 IN | BODY MASS INDEX: 30.81 KG/M2

## 2024-09-09 DIAGNOSIS — M47.816 SPONDYLOSIS W/OUT MYELOPATHY OR RADICULOPATHY, LUMBAR REGION: ICD-10-CM

## 2024-09-09 DIAGNOSIS — M51.27 OTHER INTERVERTEBRAL DISC DISPLACEMENT, LUMBOSACRAL REGION: ICD-10-CM

## 2024-09-09 PROCEDURE — 99213 OFFICE O/P EST LOW 20 MIN: CPT

## 2024-09-09 NOTE — HISTORY OF PRESENT ILLNESS
[Lower back] : lower back [0] : 0 [de-identified] : 9/9/24: Patient is feeling much better since last visit. Not taking prednisone or muscle relaxers anymore.  07/22/2024: Took prednisone and muscle relaxers with good relief. MRI review today.   07/10/2024: 34 yr old female presents c/o episodic low back pain and spasm with intermittent left anterior thigh pain that developed in 2020 with no acute injury or trauma. Patient reports most recent flare started 7/5 started on course of naproxen with no improvement. She has taken MDP and muscle relaxers after previous flar ups, she has also gone to chiro and massage.  symptoms worsen when standing or sitting for extended time, radiculopathy begins when sitting or driving. denies n/t, denies change in b/b function.   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

## 2024-09-09 NOTE — ASSESSMENT
[FreeTextEntry1] : 34 F with LBp and  LLe Radic. failed chiro, MDP, nsaids  C/w PT transition to HEP F PRN

## 2024-09-09 NOTE — HISTORY OF PRESENT ILLNESS
[Lower back] : lower back [0] : 0 [de-identified] : 9/9/24: Patient is feeling much better since last visit. Not taking prednisone or muscle relaxers anymore.  07/22/2024: Took prednisone and muscle relaxers with good relief. MRI review today.   07/10/2024: 34 yr old female presents c/o episodic low back pain and spasm with intermittent left anterior thigh pain that developed in 2020 with no acute injury or trauma. Patient reports most recent flare started 7/5 started on course of naproxen with no improvement. She has taken MDP and muscle relaxers after previous flar ups, she has also gone to chiro and massage.  symptoms worsen when standing or sitting for extended time, radiculopathy begins when sitting or driving. denies n/t, denies change in b/b function.   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

## 2024-10-04 ENCOUNTER — OFFICE (OUTPATIENT)
Dept: URBAN - METROPOLITAN AREA CLINIC 104 | Facility: CLINIC | Age: 34
Setting detail: OPHTHALMOLOGY
End: 2024-10-04
Payer: COMMERCIAL

## 2024-10-04 DIAGNOSIS — H01.015: ICD-10-CM

## 2024-10-04 DIAGNOSIS — H40.013: ICD-10-CM

## 2024-10-04 DIAGNOSIS — H52.03: ICD-10-CM

## 2024-10-04 DIAGNOSIS — H01.012: ICD-10-CM

## 2024-10-04 PROBLEM — H52.7 REFRACTIVE ERROR ; BOTH EYES: Status: ACTIVE | Noted: 2024-10-04

## 2024-10-04 PROCEDURE — 92133 CPTRZD OPH DX IMG PST SGM ON: CPT | Performed by: OPTOMETRIST

## 2024-10-04 PROCEDURE — 92015 DETERMINE REFRACTIVE STATE: CPT | Performed by: OPTOMETRIST

## 2024-10-04 PROCEDURE — 92004 COMPRE OPH EXAM NEW PT 1/>: CPT | Performed by: OPTOMETRIST

## 2024-10-04 ASSESSMENT — CONFRONTATIONAL VISUAL FIELD TEST (CVF)
OD_FINDINGS: FULL
OS_FINDINGS: FULL

## 2024-10-04 ASSESSMENT — REFRACTION_MANIFEST
OD_SPHERE: +1.25
OS_CYLINDER: -2.25
OS_SPHERE: PLANO
OD_AXIS: 090
OD_CYLINDER: -2.25
OS_VA1: 20/20
OD_AXIS: 85
OS_AXIS: 90
OD_SPHERE: PLANO
OS_VA1: 20/20
OD_CYLINDER: -2.50
OD_VA1: 20/20
OS_SPHERE: PLANO
OS_CYLINDER: -1.75
OS_AXIS: 085
OD_VA1: 20/20

## 2024-10-04 ASSESSMENT — REFRACTION_AUTOREFRACTION
OD_SPHERE: +0.25
OS_CYLINDER: -1.75
OS_SPHERE: -0.25
OD_CYLINDER: -2.50
OD_AXIS: 085
OS_AXIS: 087

## 2024-10-04 ASSESSMENT — REFRACTION_CURRENTRX
OD_OVR_VA: 20/
OS_OVR_VA: 20/

## 2024-10-04 ASSESSMENT — TONOMETRY
OD_IOP_MMHG: 18
OS_IOP_MMHG: 17

## 2024-10-04 ASSESSMENT — LID EXAM ASSESSMENTS
OD_BLEPHARITIS: RLL T
OS_BLEPHARITIS: LLL T

## 2024-10-04 ASSESSMENT — VISUAL ACUITY
OD_BCVA: 20/25
OS_BCVA: 20/40

## 2024-10-11 NOTE — ED CDU PROVIDER INITIAL DAY NOTE - OBSERVATION MONITORING PLAN
ED Record Reviewed Return to the Emergency Department immediately if you develop worsening bleeding, fever, abdominal pain, lightheaedness, dizziness, shortness of breath, or you have any other concerns.  Please follow up with your GI doctor in 2-3 days.